# Patient Record
Sex: MALE
[De-identification: names, ages, dates, MRNs, and addresses within clinical notes are randomized per-mention and may not be internally consistent; named-entity substitution may affect disease eponyms.]

---

## 2017-08-15 ENCOUNTER — HOSPITAL ENCOUNTER (EMERGENCY)
Dept: HOSPITAL 14 - H.ER | Age: 22
Discharge: HOME | End: 2017-08-15
Payer: COMMERCIAL

## 2017-08-15 VITALS
HEART RATE: 86 BPM | SYSTOLIC BLOOD PRESSURE: 108 MMHG | DIASTOLIC BLOOD PRESSURE: 56 MMHG | TEMPERATURE: 98 F | RESPIRATION RATE: 16 BRPM | OXYGEN SATURATION: 100 %
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
## 2017-08-15 DIAGNOSIS — K50.90: ICD-10-CM

## 2017-08-15 DIAGNOSIS — S81.031A: Primary | ICD-10-CM
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
## 2017-08-15 NOTE — ED PDOC
Lower Extremity Pain/Injury


Time Seen by Provider: 08/15/17 16:14


Chief Complaint (Nursing): Abnormal Skin Integrity


Chief Complaint (Provider): Right knee injury


History Per: Patient


History/Exam Limitations: no limitations


Onset/Duration Of Symptoms: Days (1)


Current Symptoms Are (Timing): Still Present


Additional Complaint(s): 


The patient is a 21yo male, presents to the ED for evaluation of right knee 

injury, sustained yesterday while doing some yard work. Patient reports he was 

attmepting to cut something and ended up injuring his right knee. He denies any 

active bleeding and reports full range of motion. Denies any numbness or 

tingling. Patient reports ihs tetanus is up to date. He offers no additional 

medical complaints.





- Knee


Description Of Injury: Laceration





Past Medical History


Reviewed: Historical Data, Nursing Documentation, Vital Signs


Vital Signs: 





 Last Vital Signs











Temp  98.0 F   08/15/17 16:10


 


Pulse  86   08/15/17 16:10


 


Resp  16   08/15/17 16:10


 


BP  108/56 L  08/15/17 16:10


 


Pulse Ox  100   08/15/17 16:10














- Medical History


PMH: Crohn's Disease





- Surgical History


Surgical History: No Surg Hx





- Family History


Family History: States: No Known Family Hx





- Home Medications


Home Medications: 


 Ambulatory Orders











 Medication  Instructions  Recorded


 


Cyclobenzaprine HCl [Flexeril] 10 mg PO HS #10 tab 11/01/15


 


Ibuprofen [Motrin] 600 mg PO Q6 PRN #20 tab 11/01/15


 


Oxycodone HCl/Acetaminophen 1 tab PO Q4 #10 tab 11/01/15





[Percocet 325 mg-5 mg]  


 


Cephalexin [Keflex] 500 mg PO BID #14 capsule 08/15/17














- Allergies


Allergies/Adverse Reactions: 


 Allergies











Allergy/AdvReac Type Severity Reaction Status Date / Time


 


No Known Allergies Allergy   Verified 05/26/16 17:34














Review of Systems


ROS Statement: Except As Marked, All Systems Reviewed And Found Negative


Musculoskeletal: Positive for: Leg Pain (right knee pain)


Neurological: Negative for: Weakness, Numbness





Physical Exam





- Reviewed


Nursing Documentation Reviewed: Yes


Vital Signs Reviewed: Yes





- Physical Exam


Appears: Positive for: Well, Non-toxic, No Acute Distress


Head Exam: Positive for: ATRAUMATIC, NORMAL INSPECTION, NORMOCEPHALIC


Skin: Positive for: Normal Color


Eye Exam: Positive for: Normal appearance


Respiratory: Negative for: Respiratory Distress


Extremity: Positive for: Normal ROM, Other (0.5 cm puncture wound noted to 

right knee, no surrounding erythema or discharge noted).  Negative for: 

Deformity, Swelling


Neurologic/Psych: Positive for: Alert, Oriented.  Negative for: Motor/Sensory 

Deficits





- ECG


O2 Sat by Pulse Oximetry: 100 (RA)


Pulse Ox Interpretation: Normal





Medical Decision Making


Medical Decision Making: 





Time: 1625


Impression: Right knee injury


Plan:


-- Wound cleaned, sterile dressing applied and patient instructed to follow up 

with PCP.


Given Rx Keflex. Patient stable for d/c home


Scribe Attestation:


Documented by Albania Pineda acting as a scribe for RUSSEL Melendez


Provider Attestation:


All medical record entries made by the Scribe were at my direction and 

personally dictated by me. I have reviewed the chart and agree that the record 

accurately reflects my personal performance of the history, physical exam, 

medical decision making, and the department course for this patient. I have 

also personally directed, reviewed, and agree with the discharge instructions 

and disposition.





Disposition





- Clinical Impression


Clinical Impression: 


 Puncture wound








- Disposition


Disposition: Routine/Home


Disposition Time: 17:00


Condition: GOOD


Prescriptions: 


Cephalexin [Keflex] 500 mg PO BID #14 capsule


Instructions:  Puncture Wound (ED)

## 2017-08-15 NOTE — ED PDOC
HPI: Skin/Bite Injury


Time Seen by Provider: 08/15/17 16:14


Chief Complaint (Nursing): Abnormal Skin Integrity





Past Medical History


Vital Signs: 





 Last Vital Signs











Temp  98.0 F   08/15/17 16:10


 


Pulse  86   08/15/17 16:10


 


Resp  16   08/15/17 16:10


 


BP  108/56 L  08/15/17 16:10


 


Pulse Ox  100   08/15/17 16:10














- Medical History


PMH: Crohn's Disease





- Home Medications


Home Medications: 


 Ambulatory Orders











 Medication  Instructions  Recorded


 


Cyclobenzaprine HCl [Flexeril] 10 mg PO HS #10 tab 11/01/15


 


Ibuprofen [Motrin] 600 mg PO Q6 PRN #20 tab 11/01/15


 


Oxycodone HCl/Acetaminophen 1 tab PO Q4 #10 tab 11/01/15





[Percocet 325 mg-5 mg]  


 


Cephalexin [Keflex] 500 mg PO BID #14 capsule 08/15/17














- Allergies


Allergies/Adverse Reactions: 


 Allergies











Allergy/AdvReac Type Severity Reaction Status Date / Time


 


No Known Allergies Allergy   Verified 05/26/16 17:34














- ECG


O2 Sat by Pulse Oximetry: 100





Disposition





- Clinical Impression


Clinical Impression: 


 Puncture wound








- Patient ED Disposition


Is Patient to be Admitted: No


Counseled Patient/Family Regarding: Diagnosis, Need For Followup, Rx Given





- Disposition


Disposition: Routine/Home


Disposition Time: 17:06


Condition: GOOD


Prescriptions: 


Cephalexin [Keflex] 500 mg PO BID #14 capsule


Instructions:  Puncture Wound (ED)

## 2018-06-19 ENCOUNTER — HOSPITAL ENCOUNTER (OUTPATIENT)
Dept: HOSPITAL 14 - H.ER | Age: 23
Setting detail: OBSERVATION
LOS: 1 days | Discharge: HOME | End: 2018-06-20
Payer: COMMERCIAL

## 2018-06-19 DIAGNOSIS — K50.90: ICD-10-CM

## 2018-06-19 DIAGNOSIS — N20.1: Primary | ICD-10-CM

## 2018-06-19 LAB
ALBUMIN SERPL-MCNC: 4.7 G/DL (ref 3.5–5)
ALBUMIN/GLOB SERPL: 1.4 {RATIO} (ref 1–2.1)
ALT SERPL-CCNC: 354 U/L (ref 21–72)
APTT BLD: 38.4 SECONDS (ref 25.6–37.1)
AST SERPL-CCNC: 149 U/L (ref 17–59)
BASOPHILS # BLD AUTO: 0 K/UL (ref 0–0.2)
BASOPHILS NFR BLD: 0.3 % (ref 0–2)
BILIRUB UR-MCNC: NEGATIVE MG/DL
BUN SERPL-MCNC: 14 MG/DL (ref 9–20)
CALCIUM SERPL-MCNC: 9.6 MG/DL (ref 8.4–10.2)
COLOR UR: (no result)
EOSINOPHIL # BLD AUTO: 0.1 K/UL (ref 0–0.7)
EOSINOPHIL NFR BLD: 2.6 % (ref 0–4)
ERYTHROCYTE [DISTWIDTH] IN BLOOD BY AUTOMATED COUNT: 18.7 % (ref 11.5–14.5)
GFR NON-AFRICAN AMERICAN: > 60
GLUCOSE UR STRIP-MCNC: (no result) MG/DL
HGB BLD-MCNC: 13.6 G/DL (ref 12–18)
INR PPP: 1.2 (ref 0.9–1.2)
LEUKOCYTE ESTERASE UR-ACNC: (no result) LEU/UL
LYMPHOCYTES # BLD AUTO: 0.7 K/UL (ref 1–4.3)
LYMPHOCYTES NFR BLD AUTO: 24.6 % (ref 20–40)
MCH RBC QN AUTO: 29.5 PG (ref 27–31)
MCHC RBC AUTO-ENTMCNC: 33 G/DL (ref 33–37)
MCV RBC AUTO: 89.3 FL (ref 80–94)
MONOCYTES # BLD: 0.2 K/UL (ref 0–0.8)
MONOCYTES NFR BLD: 6.7 % (ref 0–10)
NEUTROPHILS # BLD: 1.9 K/UL (ref 1.8–7)
NEUTROPHILS NFR BLD AUTO: 65.8 % (ref 50–75)
NRBC BLD AUTO-RTO: 0.1 % (ref 0–0)
PH UR STRIP: 7 [PH] (ref 5–8)
PLATELET # BLD: 194 K/UL (ref 130–400)
PMV BLD AUTO: 8.8 FL (ref 7.2–11.7)
PROT UR STRIP-MCNC: 30 MG/DL
PROTHROMBIN TIME: 13.6 SECONDS (ref 9.8–13.1)
RBC # BLD AUTO: 4.62 MIL/UL (ref 4.4–5.9)
RBC # UR STRIP: (no result) /UL
SP GR UR STRIP: 1.03 (ref 1–1.03)
URINE CLARITY: (no result)
UROBILINOGEN UR-MCNC: (no result) MG/DL (ref 0.2–1)
WBC # BLD AUTO: 3 K/UL (ref 4.8–10.8)

## 2018-06-19 PROCEDURE — 87086 URINE CULTURE/COLONY COUNT: CPT

## 2018-06-19 PROCEDURE — 85025 COMPLETE CBC W/AUTO DIFF WBC: CPT

## 2018-06-19 PROCEDURE — 52332 CYSTOSCOPY AND TREATMENT: CPT

## 2018-06-19 PROCEDURE — 81003 URINALYSIS AUTO W/O SCOPE: CPT

## 2018-06-19 PROCEDURE — 80053 COMPREHEN METABOLIC PANEL: CPT

## 2018-06-19 PROCEDURE — 85730 THROMBOPLASTIN TIME PARTIAL: CPT

## 2018-06-19 PROCEDURE — 74176 CT ABD & PELVIS W/O CONTRAST: CPT

## 2018-06-19 PROCEDURE — 99284 EMERGENCY DEPT VISIT MOD MDM: CPT

## 2018-06-19 PROCEDURE — 87040 BLOOD CULTURE FOR BACTERIA: CPT

## 2018-06-19 PROCEDURE — 96365 THER/PROPH/DIAG IV INF INIT: CPT

## 2018-06-19 PROCEDURE — 85610 PROTHROMBIN TIME: CPT

## 2018-06-19 PROCEDURE — 96361 HYDRATE IV INFUSION ADD-ON: CPT

## 2018-06-19 PROCEDURE — 96375 TX/PRO/DX INJ NEW DRUG ADDON: CPT

## 2018-06-19 NOTE — CT
PROCEDURE:  CT Abdomen and Pelvis without intravenous contrast



HISTORY:

Flank pain. 



COMPARISON:

None.



TECHNIQUE:

Technique. 



Contrast dose: 



Radiation dose:



Total exam DLP =  mGy-cm.



This CT exam was performed using one or more of the following dose 

reduction techniques: Automated exposure control, adjustment of the 

mA and/or kV according to patient size, and/or use of iterative 

reconstruction technique.



FINDINGS:



LOWER THORAX:

Unremarkable. 



LIVER:

Unremarkable. No gross lesion or ductal dilatation.  



GALLBLADDER AND BILE DUCTS:

Unremarkable. 



PANCREAS:

Unremarkable. No gross lesion or ductal dilatation.



SPLEEN:

Unremarkable. 



ADRENALS:

Unremarkable. No mass. 



KIDNEYS AND URETERS:

Bilateral upper tract calculi none larger than 4 mm. 



Left hydronephrosis, hydroureter related to a distal 6 mm calculus 

within 1.5 cm of the left ureterovesical junction. 



VASCULATURE:

Unremarkable. No aortic aneurysm. 



BOWEL:

Unremarkable. No obstruction. No gross mural thickening. 



APPENDIX:

Prior appendectomy 



PERITONEUM:

Unremarkable. No free fluid. No free air. 



LYMPH NODES:

Unremarkable. No enlarged lymph nodes. 



BLADDER:

Unremarkable. 



REPRODUCTIVE:

Unremarkable. 



BONES:

No acute fracture. 



OTHER FINDINGS:

None.



IMPRESSION:

Unilateral, left obstructive uropathy related to distal 6 mm calculus 

within 1.5 cm of the left ureterovesical junction.



Multiple bilateral upper tract calculi none larger than 4 mm.

## 2018-06-19 NOTE — ED PDOC
HPI: Back


Time Seen by Provider: 06/19/18 17:29


Chief Complaint (Nursing): Back Pain


Chief Complaint (Provider): back pain


History Per: Patient (=)


History/Exam Limitations: no limitations


Onset/Duration Of Symptoms: Days (Friday)


Additional Complaint(s): 





Pt. with left back pain suddenly on Friday.  Went to [place] and dx with 4mm 

stone in left ureter.  Dc with flomax, tramadol, and ceftidine.  Pt. was doing 

better and then today same pain started suddenly.  Pt. has nausea, no vomit.  

No abd pain, dysuria, diarrhea, testicular pain, weakness, chest pain.  





Past Medical History


Reviewed: Nursing Documentation, Vital Signs


Vital Signs: 


 Last Vital Signs











Temp  97.0 F L  06/19/18 17:24


 


Pulse  94 H  06/19/18 17:24


 


Resp  18   06/19/18 17:24


 


BP  118/81   06/19/18 17:24


 


Pulse Ox  98   06/19/18 17:24














- Medical History


PMH: Crohn's Disease





- Surgical History


Surgical History: No Surg Hx





- Family History


Family History: States: Unknown Family Hx





- Home Medications


Home Medications: 


 Ambulatory Orders











 Medication  Instructions  Recorded


 


Cyclobenzaprine HCl [Flexeril] 10 mg PO HS #10 tab 11/01/15


 


Ibuprofen [Motrin] 600 mg PO Q6 PRN #20 tab 11/01/15


 


Oxycodone HCl/Acetaminophen 1 tab PO Q4 #10 tab 11/01/15





[Percocet 325 mg-5 mg]  


 


Cephalexin [Keflex] 500 mg PO BID #14 capsule 08/15/17














- Allergies


Allergies/Adverse Reactions: 


 Allergies











Allergy/AdvReac Type Severity Reaction Status Date / Time


 


No Known Allergies Allergy   Verified 06/19/18 17:23














Review of Systems


ROS Statement: Except As Marked, All Systems Reviewed And Found Negative


Gastrointestinal: Positive for: Nausea


Musculoskeletal: Positive for: Back Pain





Physical Exam





- Reviewed


Nursing Documentation Reviewed: Yes


Vital Signs Reviewed: Yes





- Physical Exam


Appears: Positive for: Non-toxic, No Acute Distress


Head Exam: Positive for: ATRAUMATIC, NORMAL INSPECTION, NORMOCEPHALIC


Skin: Positive for: Normal Color, Warm, DRY


Eye Exam: Positive for: EOMI, Normal appearance, PERRL


ENT: Positive for: Normal ENT Inspection


Neck: Positive for: Normal, Painless ROM


Cardiovascular/Chest: Positive for: Regular Rate, Rhythm


Respiratory: Positive for: CNT, Normal Breath Sounds


Gastrointestinal/Abdominal: Positive for: Normal Exam, Soft.  Negative for: 

Tenderness


Back: Positive for: L CVA Tenderness.  Negative for: R CVA Tenderness


Extremity: Positive for: Normal ROM.  Negative for: Tenderness, Pedal Edema


Neurologic/Psych: Positive for: Alert, Oriented





- Laboratory Results


Result Diagrams: 


 06/19/18 18:21





 06/19/18 18:21


Interpretation Of Abn Labs: mild elevated ast and alt





- ECG


O2 Sat by Pulse Oximetry: 98


Pulse Ox Interpretation: Normal





- Progress


ED Course And Treament: 








1830: Family wanted Dr. Higuera for urology.  Dr. Higuera paged by writer and no 

response.





1910:  Dr. Higuera paged and no response.





1945:  Dr. Higuera paged and no response.  Family wishes to take on call 

urologist.  





2016:  Spoke with Dr. Jacome.  Will admit to his service.  NPO and he will do 

stent tomorrow.    Wants cipro and flomax.  





Disposition





- Clinical Impression


Clinical Impression: 


 Ureteral stone








- Patient ED Disposition


Is Patient to be Admitted: Yes


Counseled Patient/Family Regarding: Studies Performed, Diagnosis





- Disposition


Disposition Time: 20:19


Condition: FAIR





- Pt Status Changed To:


Hospital Disposition Of: Observation

## 2018-06-20 VITALS — DIASTOLIC BLOOD PRESSURE: 65 MMHG | SYSTOLIC BLOOD PRESSURE: 99 MMHG | HEART RATE: 91 BPM

## 2018-06-20 VITALS — RESPIRATION RATE: 20 BRPM | OXYGEN SATURATION: 97 % | TEMPERATURE: 97.9 F

## 2018-06-20 RX ADMIN — ACETAMINOPHEN AND CODEINE PHOSPHATE PRN TAB: 300; 30 TABLET ORAL at 08:07

## 2018-06-20 RX ADMIN — ACETAMINOPHEN AND CODEINE PHOSPHATE PRN TAB: 300; 30 TABLET ORAL at 02:06

## 2018-06-21 NOTE — OP
PROCEDURE DATE:  06/20/18



SURGEON: Lalo Jacome MD



ANESTHESIOLOGIST: Vikram Romero MD



ANESTHESIA: General



PROCEDURE: Cystoscopy with stent placement



INDICATION:  The patient was admitted with left flank pain.  The patient

had a previous episode of colic, started to try Flomax and antibiotics. 

However, the patient still continued to have left flank pain.



DESCRIPTION OF PROCEDURE:   The patient was brought to the OR, prepped and

draped in the usual manner after general anesthesia given.  A #3.5 wire was

inserted into the left ureter.  Double J stent was then placed over the

wire and the wire removed.  Good positioning of the stent.  On CT scan,

calculus was presented at the distal ureter, adjacent to the stent.



POST-OP CONDITION: The patient tolerated the procedure well, left the OR in

good condition.





__________________

TRU Parker





DD:  06/20/2018 17:32:32

DT:  06/21/2018 2:01:40

Job # [id number]